# Patient Record
Sex: MALE | Race: WHITE | URBAN - METROPOLITAN AREA
[De-identification: names, ages, dates, MRNs, and addresses within clinical notes are randomized per-mention and may not be internally consistent; named-entity substitution may affect disease eponyms.]

---

## 2021-10-07 ENCOUNTER — OFFICE VISIT (OUTPATIENT)
Dept: URBAN - METROPOLITAN AREA CLINIC 50 | Facility: CLINIC | Age: 71
End: 2021-10-07

## 2022-03-07 ENCOUNTER — OUT OF OFFICE VISIT (OUTPATIENT)
Dept: URBAN - METROPOLITAN AREA MEDICAL CENTER 28 | Facility: MEDICAL CENTER | Age: 72
End: 2022-03-07
Payer: MEDICARE

## 2022-03-07 DIAGNOSIS — D64.89 ANEMIA DUE TO OTHER CAUSE: ICD-10-CM

## 2022-03-07 DIAGNOSIS — K92.1 ACUTE MELENA: ICD-10-CM

## 2022-03-07 DIAGNOSIS — K26.9 CHILDHOOD DUODENAL ULCER: ICD-10-CM

## 2022-03-07 DIAGNOSIS — R74.01 ABNORMAL/ELEVATED TRANSAMINASE (SGOT, AMINOTRANSFERASE): ICD-10-CM

## 2022-03-07 PROCEDURE — 99232 SBSQ HOSP IP/OBS MODERATE 35: CPT | Performed by: PHYSICIAN ASSISTANT

## 2022-03-07 PROCEDURE — G8427 DOCREV CUR MEDS BY ELIG CLIN: HCPCS | Performed by: PHYSICIAN ASSISTANT

## 2022-03-07 PROCEDURE — 99222 1ST HOSP IP/OBS MODERATE 55: CPT | Performed by: PHYSICIAN ASSISTANT

## 2022-03-08 ENCOUNTER — OUT OF OFFICE VISIT (OUTPATIENT)
Dept: URBAN - METROPOLITAN AREA MEDICAL CENTER 28 | Facility: MEDICAL CENTER | Age: 72
End: 2022-03-08
Payer: MEDICARE

## 2022-03-08 DIAGNOSIS — K26.9 CHILDHOOD DUODENAL ULCER: ICD-10-CM

## 2022-03-08 DIAGNOSIS — K92.1 ACUTE MELENA: ICD-10-CM

## 2022-03-08 DIAGNOSIS — K29.60 ADENOPAPILLOMATOSIS GASTRICA: ICD-10-CM

## 2022-03-08 PROCEDURE — 43235 EGD DIAGNOSTIC BRUSH WASH: CPT | Performed by: INTERNAL MEDICINE

## 2022-03-18 ENCOUNTER — OFFICE VISIT (OUTPATIENT)
Dept: URBAN - METROPOLITAN AREA CLINIC 96 | Facility: CLINIC | Age: 72
End: 2022-03-18
Payer: MEDICARE

## 2022-03-18 ENCOUNTER — WEB ENCOUNTER (OUTPATIENT)
Dept: URBAN - METROPOLITAN AREA CLINIC 96 | Facility: CLINIC | Age: 72
End: 2022-03-18

## 2022-03-18 DIAGNOSIS — R00.0 TACHYCARDIA: ICD-10-CM

## 2022-03-18 DIAGNOSIS — K76.0 FATTY LIVER: ICD-10-CM

## 2022-03-18 DIAGNOSIS — K26.9 DUODENAL ULCER: ICD-10-CM

## 2022-03-18 DIAGNOSIS — F10.20 ALCOHOLIC: ICD-10-CM

## 2022-03-18 PROBLEM — 51868009: Status: ACTIVE | Noted: 2022-03-18

## 2022-03-18 PROBLEM — 197321007: Status: ACTIVE | Noted: 2022-03-18

## 2022-03-18 PROBLEM — 7200002: Status: ACTIVE | Noted: 2022-03-18

## 2022-03-18 PROCEDURE — 99214 OFFICE O/P EST MOD 30 MIN: CPT | Performed by: INTERNAL MEDICINE

## 2022-03-18 RX ORDER — THIAMINE MONONITRATE (VIT B1) 100 MG
1 TABLET TABLET ORAL ONCE A DAY
Qty: 90 TABLET | Refills: 3 | OUTPATIENT

## 2022-03-18 RX ORDER — PANTOPRAZOLE SODIUM 40 MG/1
1 TABLET TABLET, DELAYED RELEASE ORAL BID
Qty: 180 TABLET | Refills: 1 | OUTPATIENT

## 2022-03-18 RX ORDER — FOLIC ACID 1 MG/1
1 TABLET TABLET ORAL ONCE A DAY
Qty: 90 TABLET | Refills: 3 | OUTPATIENT

## 2022-03-18 NOTE — HPI-TODAY'S VISIT:
The patient was referred by Dr. Peña Sanchez for abn labs.   A copy of this document is being forwarded to the referring provider.  71 y.o. WM, presents w/ sisters, lives by self Had abn labs w/ PCP - had appt arranged w/ me In interim, spit up blood and had black stool So, went to [facility] and had EGD D/c March 10th or so No more dark stools No more hematemesis No N/V Energy is better Feels better than when went into hospital Pt says good about taking medicines SIster says not entirely compliant - has problem remembering things Pt states drinking down to 2-3 drinks / day; sister says 10-12 - vodka Occ NSAIDs Had been doing baby aspirin

## 2022-03-20 LAB
A/G RATIO: 1.2
ALBUMIN: 3.6
ALKALINE PHOSPHATASE: 100
ALT (SGPT): 39
AST (SGOT): 55
BASO (ABSOLUTE): 0
BASOS: 1
BILIRUBIN, TOTAL: 0.5
BUN/CREATININE RATIO: 9
BUN: 12
CALCIUM: 9
CARBON DIOXIDE, TOTAL: 20
CHLORIDE: 106
CREATININE: 1.27
EGFR: 60
EOS (ABSOLUTE): 0.1
EOS: 2
GLOBULIN, TOTAL: 2.9
GLUCOSE: 108
H PYLORI, IGM ABS: <9
H. PYLORI, IGA ABS: <9
H. PYLORI, IGG ABS: 0.54
HEMATOCRIT: 30.3
HEMATOLOGY COMMENTS:: (no result)
HEMOGLOBIN: 10
IMMATURE CELLS: (no result)
IMMATURE GRANS (ABS): 0
IMMATURE GRANULOCYTES: 1
LYMPHS (ABSOLUTE): 1
LYMPHS: 18
MCH: 31.4
MCHC: 33
MCV: 95
MONOCYTES(ABSOLUTE): 0.4
MONOCYTES: 6
NEUTROPHILS (ABSOLUTE): 4.2
NEUTROPHILS: 72
NRBC: (no result)
PLATELETS: 264
POTASSIUM: 5.5
PROTEIN, TOTAL: 6.5
RBC: 3.18
RDW: 17.1
SODIUM: 143
T4,FREE(DIRECT): 0.92
TSH: 5.08
WBC: 5.8

## 2022-06-13 ENCOUNTER — OFFICE VISIT (OUTPATIENT)
Dept: URBAN - METROPOLITAN AREA CLINIC 96 | Facility: CLINIC | Age: 72
End: 2022-06-13

## 2022-12-30 ENCOUNTER — WEB ENCOUNTER (OUTPATIENT)
Dept: URBAN - METROPOLITAN AREA CLINIC 27 | Facility: CLINIC | Age: 72
End: 2022-12-30

## 2023-01-05 ENCOUNTER — DASHBOARD ENCOUNTERS (OUTPATIENT)
Age: 73
End: 2023-01-05

## 2023-01-05 ENCOUNTER — OFFICE VISIT (OUTPATIENT)
Dept: URBAN - METROPOLITAN AREA CLINIC 27 | Facility: CLINIC | Age: 73
End: 2023-01-05
Payer: MEDICARE

## 2023-01-05 VITALS
BODY MASS INDEX: 25.03 KG/M2 | WEIGHT: 195 LBS | HEIGHT: 74 IN | DIASTOLIC BLOOD PRESSURE: 98 MMHG | RESPIRATION RATE: 17 BRPM | HEART RATE: 116 BPM | SYSTOLIC BLOOD PRESSURE: 152 MMHG

## 2023-01-05 DIAGNOSIS — I48.91 ATRIAL FIBRILLATION, UNSPECIFIED TYPE: ICD-10-CM

## 2023-01-05 DIAGNOSIS — D64.9 ANEMIA, UNSPECIFIED TYPE: ICD-10-CM

## 2023-01-05 DIAGNOSIS — Z86.010 HISTORY OF COLON POLYPS: ICD-10-CM

## 2023-01-05 DIAGNOSIS — Z87.11 HISTORY OF PEPTIC ULCER: ICD-10-CM

## 2023-01-05 DIAGNOSIS — I10 PRIMARY HYPERTENSION: ICD-10-CM

## 2023-01-05 PROBLEM — 371434005: Status: ACTIVE | Noted: 2023-01-05

## 2023-01-05 PROBLEM — 49436004: Status: ACTIVE | Noted: 2023-01-05

## 2023-01-05 PROBLEM — 59621000 PRIMARY HYPERTENSION: Status: ACTIVE | Noted: 2023-01-05

## 2023-01-05 PROCEDURE — 99245 OFF/OP CONSLTJ NEW/EST HI 55: CPT | Performed by: PHYSICIAN ASSISTANT

## 2023-01-05 PROCEDURE — 99204 OFFICE O/P NEW MOD 45 MIN: CPT | Performed by: PHYSICIAN ASSISTANT

## 2023-01-05 RX ORDER — DULOXETINE 30 MG/1
1 CAPSULE CAPSULE, DELAYED RELEASE PELLETS ORAL ONCE A DAY
Status: ACTIVE | COMMUNITY

## 2023-01-05 RX ORDER — TRAZODONE HYDROCHLORIDE 150 MG/1
1 TABLET AT BEDTIME TABLET ORAL
Status: ACTIVE | COMMUNITY

## 2023-01-05 RX ORDER — DIPHENHYDRAMINE HYDROCHLORIDE 25 MG/1
1 CAPSULE AT BEDTIME AS NEEDED CAPSULE ORAL ONCE A DAY
Status: ACTIVE | COMMUNITY

## 2023-01-05 RX ORDER — TAMSULOSIN HYDROCHLORIDE 0.4 MG/1
1 CAPSULE CAPSULE ORAL ONCE A DAY
Status: ACTIVE | COMMUNITY

## 2023-01-05 RX ORDER — COLCHICINE 0.6 MG/1
1 TABLET TABLET, FILM COATED ORAL
Status: ACTIVE | COMMUNITY

## 2023-01-05 RX ORDER — FLUOCINONIDE 1 MG/G
1 APPLICATION CREAM TOPICAL ONCE A DAY
Status: ACTIVE | COMMUNITY

## 2023-01-05 RX ORDER — ACETAMINOPHEN 325 MG/1
1 TABLET AS NEEDED TABLET ORAL
Status: ACTIVE | COMMUNITY

## 2023-01-05 RX ORDER — FOLIC ACID 1 MG/1
1 TABLET TABLET ORAL ONCE A DAY
Qty: 90 TABLET | Refills: 3 | Status: ACTIVE | COMMUNITY

## 2023-01-05 RX ORDER — CETIRIZINE HYDROCHLORIDE 10 MG/1
1 TABLET TABLET, FILM COATED ORAL ONCE A DAY
Status: ACTIVE | COMMUNITY

## 2023-01-05 RX ORDER — MAGNESIUM OXIDE 400 MG/1
1 TABLET AS NEEDED TABLET ORAL TWICE A DAY
Status: ACTIVE | COMMUNITY

## 2023-01-05 RX ORDER — LISINOPRIL 10 MG/1
1 TABLET TABLET ORAL
Status: ACTIVE | COMMUNITY

## 2023-01-05 RX ORDER — FOLIC ACID 1 MG/1
1 TABLET TABLET ORAL
Status: ACTIVE | COMMUNITY

## 2023-01-05 RX ORDER — PANTOPRAZOLE SODIUM 40 MG/1
1 TABLET TABLET, DELAYED RELEASE ORAL BID
Qty: 180 TABLET | Refills: 1 | Status: DISCONTINUED | COMMUNITY

## 2023-01-05 RX ORDER — TRAMADOL HYDROCHLORIDE 50 MG/1
1 TABLET AS NEEDED TABLET, FILM COATED ORAL ONCE A DAY
Status: ACTIVE | COMMUNITY

## 2023-01-05 RX ORDER — DICLOFENAC SODIUM TOPICAL GEL, 1% 10 MG/G
AS DIRECTED GEL TOPICAL
Status: ACTIVE | COMMUNITY

## 2023-01-05 RX ORDER — THIAMINE MONONITRATE (VIT B1) 100 MG
1 TABLET TABLET ORAL ONCE A DAY
Qty: 90 TABLET | Refills: 3 | Status: ACTIVE | COMMUNITY

## 2023-01-05 RX ORDER — PANTOPRAZOLE SODIUM 40 MG/1
1 TABLET TABLET, DELAYED RELEASE ORAL ONCE A DAY
Status: ACTIVE | COMMUNITY

## 2023-01-05 NOTE — HPI-TODAY'S VISIT:
Mr. Luque is a 72-year-old-male seen at the request of Dr. Washington for anemia. A copy of this document will be sent to the referring physician. He was first diagnosed with anemia in March 2022 when he presented to [facility] with melena. EGD significant for a large nonbleeding cratered duodenal ulcer without stigmata of bleeding and gastritis. He was treated with Protonix for which he continues to take. At that time, his Hgb was 6.7, Hct 21.3. He believes he received a unit of blood. His most recent Hgb on 12/29/22 was 8.8, hct 28.3, MCV 71.3. He denies melena, hematochezia, hematemesis. He does not take NSAIDs. He was recently diagnosed with atrial fibrillation but has not started a blood thinner. On ROS, he reports dysphagia to solid food if he eats too fast. He has to regurgitate the food bolus. Former alcoholic. Last drink was May 2022. His last colonoscopy was about 10 years ago and positive for polyps.

## 2023-01-06 PROBLEM — 428283002: Status: ACTIVE | Noted: 2023-01-05

## 2023-01-27 ENCOUNTER — OFFICE VISIT (OUTPATIENT)
Dept: URBAN - METROPOLITAN AREA SURGERY CENTER 7 | Facility: SURGERY CENTER | Age: 73
End: 2023-01-27
Payer: MEDICARE

## 2023-01-27 ENCOUNTER — CLAIMS CREATED FROM THE CLAIM WINDOW (OUTPATIENT)
Dept: URBAN - METROPOLITAN AREA CLINIC 4 | Facility: CLINIC | Age: 73
End: 2023-01-27
Payer: MEDICARE

## 2023-01-27 DIAGNOSIS — K29.60 ADENOPAPILLOMATOSIS GASTRICA: ICD-10-CM

## 2023-01-27 DIAGNOSIS — K29.70 GASTRITIS, UNSPECIFIED, WITHOUT BLEEDING: ICD-10-CM

## 2023-01-27 DIAGNOSIS — D50.9 ANEMIA: ICD-10-CM

## 2023-01-27 DIAGNOSIS — D12.3 ADENOMA OF TRANSVERSE COLON: ICD-10-CM

## 2023-01-27 PROCEDURE — 43239 EGD BIOPSY SINGLE/MULTIPLE: CPT | Performed by: INTERNAL MEDICINE

## 2023-01-27 PROCEDURE — 45380 COLONOSCOPY AND BIOPSY: CPT | Performed by: CLINIC/CENTER

## 2023-01-27 PROCEDURE — G8907 PT DOC NO EVENTS ON DISCHARG: HCPCS | Performed by: INTERNAL MEDICINE

## 2023-01-27 PROCEDURE — 88312 SPECIAL STAINS GROUP 1: CPT | Performed by: PATHOLOGY

## 2023-01-27 PROCEDURE — 88305 TISSUE EXAM BY PATHOLOGIST: CPT | Performed by: PATHOLOGY

## 2023-01-27 PROCEDURE — G8907 PT DOC NO EVENTS ON DISCHARG: HCPCS | Performed by: CLINIC/CENTER

## 2023-01-27 PROCEDURE — 45380 COLONOSCOPY AND BIOPSY: CPT | Performed by: INTERNAL MEDICINE

## 2023-01-27 PROCEDURE — 43239 EGD BIOPSY SINGLE/MULTIPLE: CPT | Performed by: CLINIC/CENTER

## 2023-01-27 RX ORDER — ACETAMINOPHEN 325 MG/1
1 TABLET AS NEEDED TABLET ORAL
Status: ACTIVE | COMMUNITY

## 2023-01-27 RX ORDER — FOLIC ACID 1 MG/1
1 TABLET TABLET ORAL ONCE A DAY
Qty: 90 TABLET | Refills: 3 | Status: ACTIVE | COMMUNITY

## 2023-01-27 RX ORDER — FOLIC ACID 1 MG/1
1 TABLET TABLET ORAL
Status: ACTIVE | COMMUNITY

## 2023-01-27 RX ORDER — TRAMADOL HYDROCHLORIDE 50 MG/1
1 TABLET AS NEEDED TABLET, FILM COATED ORAL ONCE A DAY
Status: ACTIVE | COMMUNITY

## 2023-01-27 RX ORDER — LISINOPRIL 10 MG/1
1 TABLET TABLET ORAL
Status: ACTIVE | COMMUNITY

## 2023-01-27 RX ORDER — TAMSULOSIN HYDROCHLORIDE 0.4 MG/1
1 CAPSULE CAPSULE ORAL ONCE A DAY
Status: ACTIVE | COMMUNITY

## 2023-01-27 RX ORDER — DULOXETINE 30 MG/1
1 CAPSULE CAPSULE, DELAYED RELEASE PELLETS ORAL ONCE A DAY
Status: ACTIVE | COMMUNITY

## 2023-01-27 RX ORDER — COLCHICINE 0.6 MG/1
1 TABLET TABLET, FILM COATED ORAL
Status: ACTIVE | COMMUNITY

## 2023-01-27 RX ORDER — MAGNESIUM OXIDE 400 MG/1
1 TABLET AS NEEDED TABLET ORAL TWICE A DAY
Status: ACTIVE | COMMUNITY

## 2023-01-27 RX ORDER — PANTOPRAZOLE SODIUM 40 MG/1
1 TABLET TABLET, DELAYED RELEASE ORAL ONCE A DAY
Status: ACTIVE | COMMUNITY

## 2023-01-27 RX ORDER — FLUOCINONIDE 1 MG/G
1 APPLICATION CREAM TOPICAL ONCE A DAY
Status: ACTIVE | COMMUNITY

## 2023-01-27 RX ORDER — DIPHENHYDRAMINE HYDROCHLORIDE 25 MG/1
1 CAPSULE AT BEDTIME AS NEEDED CAPSULE ORAL ONCE A DAY
Status: ACTIVE | COMMUNITY

## 2023-01-27 RX ORDER — THIAMINE MONONITRATE (VIT B1) 100 MG
1 TABLET TABLET ORAL ONCE A DAY
Qty: 90 TABLET | Refills: 3 | Status: ACTIVE | COMMUNITY

## 2023-01-27 RX ORDER — CETIRIZINE HYDROCHLORIDE 10 MG/1
1 TABLET TABLET, FILM COATED ORAL ONCE A DAY
Status: ACTIVE | COMMUNITY

## 2023-01-27 RX ORDER — DICLOFENAC SODIUM TOPICAL GEL, 1% 10 MG/G
AS DIRECTED GEL TOPICAL
Status: ACTIVE | COMMUNITY

## 2023-01-27 RX ORDER — TRAZODONE HYDROCHLORIDE 150 MG/1
1 TABLET AT BEDTIME TABLET ORAL
Status: ACTIVE | COMMUNITY

## 2023-09-29 PROBLEM — 266998003: Status: ACTIVE | Noted: 2023-09-29
